# Patient Record
Sex: MALE | Race: WHITE | NOT HISPANIC OR LATINO | URBAN - METROPOLITAN AREA
[De-identification: names, ages, dates, MRNs, and addresses within clinical notes are randomized per-mention and may not be internally consistent; named-entity substitution may affect disease eponyms.]

---
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
## 2019-05-11 ENCOUNTER — NEW PATIENT EMERGENCY (OUTPATIENT)
Dept: URBAN - METROPOLITAN AREA CLINIC 35 | Facility: CLINIC | Age: 19
End: 2019-05-11

## 2019-05-11 DIAGNOSIS — S05.10XA: ICD-10-CM

## 2019-05-11 PROCEDURE — 99202 OFFICE O/P NEW SF 15 MIN: CPT

## 2019-05-11 RX ORDER — DUREZOL 0.5 MG/ML
1 EMULSION OPHTHALMIC TWICE A DAY
Start: 2019-05-11 | End: 2019-05-14

## 2019-05-11 ASSESSMENT — TONOMETRY
OS_IOP_MMHG: 10
OD_IOP_MMHG: 24

## 2019-05-11 ASSESSMENT — VISUAL ACUITY
OD_SC: 20/25
OS_SC: 20/20
